# Patient Record
Sex: MALE | Race: WHITE | NOT HISPANIC OR LATINO | Employment: OTHER | URBAN - METROPOLITAN AREA
[De-identification: names, ages, dates, MRNs, and addresses within clinical notes are randomized per-mention and may not be internally consistent; named-entity substitution may affect disease eponyms.]

---

## 2017-01-20 ENCOUNTER — OFFICE VISIT - HEALTHEAST (OUTPATIENT)
Dept: CARDIOLOGY | Facility: CLINIC | Age: 67
End: 2017-01-20

## 2017-01-20 DIAGNOSIS — E78.00 HYPERCHOLESTEREMIA: ICD-10-CM

## 2017-01-20 DIAGNOSIS — I25.6 SILENT MYOCARDIAL ISCHEMIA: ICD-10-CM

## 2017-01-20 DIAGNOSIS — E11.9 DIABETES MELLITUS (H): ICD-10-CM

## 2017-01-20 DIAGNOSIS — I25.10 CORONARY ARTERY DISEASE INVOLVING NATIVE CORONARY ARTERY OF NATIVE HEART WITHOUT ANGINA PECTORIS: ICD-10-CM

## 2017-01-20 DIAGNOSIS — Z78.9 STATIN INTOLERANCE: ICD-10-CM

## 2017-01-20 RX ORDER — CLONIDINE HYDROCHLORIDE 0.1 MG/1
1 TABLET ORAL 4 TIMES DAILY
Refills: 1 | Status: SHIPPED | COMMUNITY
Start: 2016-12-26

## 2017-01-20 ASSESSMENT — MIFFLIN-ST. JEOR: SCORE: 1774.12

## 2017-03-17 ENCOUNTER — HOSPITAL ENCOUNTER (OUTPATIENT)
Dept: CARDIOLOGY | Facility: CLINIC | Age: 67
Discharge: HOME | End: 2017-03-17
Attending: INTERNAL MEDICINE

## 2017-03-17 ENCOUNTER — RECORDS - HEALTHEAST (OUTPATIENT)
Dept: ADMINISTRATIVE | Facility: OTHER | Age: 67
End: 2017-03-17

## 2017-03-17 ENCOUNTER — COMMUNICATION - HEALTHEAST (OUTPATIENT)
Dept: CARDIOLOGY | Facility: CLINIC | Age: 67
End: 2017-03-17

## 2017-03-17 DIAGNOSIS — I25.10 CORONARY ARTERY DISEASE INVOLVING NATIVE CORONARY ARTERY OF NATIVE HEART WITHOUT ANGINA PECTORIS: ICD-10-CM

## 2017-03-17 LAB
ECHO EJECTION FRACTION ESTIMATED: 55 %
STRESS ECHO BASELINE BP: NORMAL M/S

## 2017-04-24 ENCOUNTER — COMMUNICATION - HEALTHEAST (OUTPATIENT)
Dept: CARDIOLOGY | Facility: CLINIC | Age: 67
End: 2017-04-24

## 2017-11-03 ENCOUNTER — COMMUNICATION - HEALTHEAST (OUTPATIENT)
Dept: ADMINISTRATIVE | Facility: CLINIC | Age: 67
End: 2017-11-03

## 2018-03-13 ENCOUNTER — COMMUNICATION - HEALTHEAST (OUTPATIENT)
Dept: ADMINISTRATIVE | Facility: CLINIC | Age: 68
End: 2018-03-13

## 2018-03-29 ENCOUNTER — COMMUNICATION - HEALTHEAST (OUTPATIENT)
Dept: CARDIOLOGY | Facility: CLINIC | Age: 68
End: 2018-03-29

## 2018-03-29 DIAGNOSIS — I25.10 CAD (CORONARY ARTERY DISEASE): ICD-10-CM

## 2018-04-06 ENCOUNTER — OFFICE VISIT - HEALTHEAST (OUTPATIENT)
Dept: CARDIOLOGY | Facility: CLINIC | Age: 68
End: 2018-04-06

## 2018-04-06 ENCOUNTER — COMMUNICATION - HEALTHEAST (OUTPATIENT)
Dept: TELEHEALTH | Facility: CLINIC | Age: 68
End: 2018-04-06

## 2018-04-06 DIAGNOSIS — I25.6 SILENT MYOCARDIAL ISCHEMIA: ICD-10-CM

## 2018-04-06 DIAGNOSIS — I10 BENIGN ESSENTIAL HTN: ICD-10-CM

## 2018-04-06 DIAGNOSIS — E11.59 TYPE 2 DIABETES MELLITUS WITH CARDIAC COMPLICATION (H): ICD-10-CM

## 2018-04-06 DIAGNOSIS — Z78.9 STATIN INTOLERANCE: ICD-10-CM

## 2018-04-06 DIAGNOSIS — E78.00 HYPERCHOLESTEREMIA: ICD-10-CM

## 2018-04-06 DIAGNOSIS — I25.10 CORONARY ARTERY DISEASE INVOLVING NATIVE CORONARY ARTERY OF NATIVE HEART WITHOUT ANGINA PECTORIS: ICD-10-CM

## 2018-04-06 LAB
ANION GAP SERPL CALCULATED.3IONS-SCNC: 8 MMOL/L (ref 5–18)
BUN SERPL-MCNC: 12 MG/DL (ref 8–28)
CALCIUM SERPL-MCNC: 9.7 MG/DL (ref 8.5–10.5)
CHLORIDE BLD-SCNC: 103 MMOL/L (ref 98–107)
CHOLEST SERPL-MCNC: 128 MG/DL
CO2 SERPL-SCNC: 29 MMOL/L (ref 22–31)
CREAT SERPL-MCNC: 0.91 MG/DL (ref 0.7–1.3)
FASTING STATUS PATIENT QL REPORTED: NO
GFR SERPL CREATININE-BSD FRML MDRD: >60 ML/MIN/1.73M2
GLUCOSE BLD-MCNC: 181 MG/DL (ref 70–125)
HDLC SERPL-MCNC: 31 MG/DL
LDLC SERPL CALC-MCNC: 79 MG/DL
POTASSIUM BLD-SCNC: 4.3 MMOL/L (ref 3.5–5)
SODIUM SERPL-SCNC: 140 MMOL/L (ref 136–145)
TRIGL SERPL-MCNC: 91 MG/DL

## 2018-04-06 ASSESSMENT — MIFFLIN-ST. JEOR: SCORE: 1769.58

## 2018-04-18 ENCOUNTER — HOSPITAL ENCOUNTER (OUTPATIENT)
Dept: CARDIOLOGY | Facility: CLINIC | Age: 68
Discharge: HOME | End: 2018-04-18
Attending: INTERNAL MEDICINE

## 2018-04-18 ENCOUNTER — HOSPITAL ENCOUNTER (OUTPATIENT)
Dept: NUCLEAR MEDICINE | Facility: CLINIC | Age: 68
Discharge: HOME | End: 2018-04-18
Attending: INTERNAL MEDICINE

## 2018-04-18 DIAGNOSIS — I25.6 SILENT MYOCARDIAL ISCHEMIA: ICD-10-CM

## 2018-04-18 DIAGNOSIS — I25.10 CORONARY ARTERY DISEASE INVOLVING NATIVE CORONARY ARTERY OF NATIVE HEART WITHOUT ANGINA PECTORIS: ICD-10-CM

## 2018-04-18 LAB
CV STRESS MAX HR HE: 78
NUC STRESS EJECTION FRACTION: 53 %
STRESS ECHO BASELINE BP: NORMAL MM OF HG
STRESS ECHO BASELINE HR: 54 BPM
STRESS ECHO CALCULATED PERCENT HR: 52 %
STRESS ECHO LAST STRESS BP: NORMAL MM OF HG

## 2018-04-18 RX ORDER — SILDENAFIL CITRATE 20 MG/1
TABLET ORAL
Refills: 3 | Status: SHIPPED | COMMUNITY
Start: 2018-03-28

## 2018-08-06 ENCOUNTER — COMMUNICATION - HEALTHEAST (OUTPATIENT)
Dept: CARDIOLOGY | Facility: CLINIC | Age: 68
End: 2018-08-06

## 2018-08-06 DIAGNOSIS — I48.91 A-FIB (H): ICD-10-CM

## 2018-08-07 ENCOUNTER — AMBULATORY - HEALTHEAST (OUTPATIENT)
Dept: CARDIOLOGY | Facility: CLINIC | Age: 68
End: 2018-08-07

## 2018-08-07 DIAGNOSIS — I48.91 A-FIB (H): ICD-10-CM

## 2018-08-07 LAB
ATRIAL RATE - MUSE: 71 BPM
DIASTOLIC BLOOD PRESSURE - MUSE: NORMAL MMHG
INTERPRETATION ECG - MUSE: NORMAL
P AXIS - MUSE: 37 DEGREES
PR INTERVAL - MUSE: 170 MS
QRS DURATION - MUSE: 146 MS
QT - MUSE: 420 MS
QTC - MUSE: 456 MS
R AXIS - MUSE: -8 DEGREES
SYSTOLIC BLOOD PRESSURE - MUSE: NORMAL MMHG
T AXIS - MUSE: -52 DEGREES
VENTRICULAR RATE- MUSE: 71 BPM

## 2018-08-15 ENCOUNTER — OFFICE VISIT - HEALTHEAST (OUTPATIENT)
Dept: CARDIOLOGY | Facility: CLINIC | Age: 68
End: 2018-08-15

## 2018-08-15 ENCOUNTER — AMBULATORY - HEALTHEAST (OUTPATIENT)
Dept: CARDIOLOGY | Facility: CLINIC | Age: 68
End: 2018-08-15

## 2018-08-15 DIAGNOSIS — E11.59 TYPE 2 DIABETES MELLITUS WITH CARDIAC COMPLICATION (H): ICD-10-CM

## 2018-08-15 DIAGNOSIS — I25.10 CORONARY ARTERY DISEASE INVOLVING NATIVE CORONARY ARTERY OF NATIVE HEART WITHOUT ANGINA PECTORIS: ICD-10-CM

## 2018-08-15 DIAGNOSIS — I10 BENIGN ESSENTIAL HTN: ICD-10-CM

## 2018-08-15 DIAGNOSIS — E78.00 HYPERCHOLESTEREMIA: ICD-10-CM

## 2018-08-15 DIAGNOSIS — I25.6 SILENT MYOCARDIAL ISCHEMIA: ICD-10-CM

## 2018-08-15 DIAGNOSIS — I49.9 IRREGULAR HEART BEAT: ICD-10-CM

## 2018-08-15 ASSESSMENT — MIFFLIN-ST. JEOR: SCORE: 1754.8

## 2019-04-28 ENCOUNTER — COMMUNICATION - HEALTHEAST (OUTPATIENT)
Dept: CARDIOLOGY | Facility: CLINIC | Age: 69
End: 2019-04-28

## 2019-04-28 DIAGNOSIS — I25.10 CORONARY ARTERY DISEASE INVOLVING NATIVE CORONARY ARTERY OF NATIVE HEART WITHOUT ANGINA PECTORIS: ICD-10-CM

## 2019-04-28 DIAGNOSIS — I25.6 SILENT MYOCARDIAL ISCHEMIA: ICD-10-CM

## 2019-04-29 RX ORDER — CHLORTHALIDONE 25 MG/1
TABLET ORAL
Qty: 30 TABLET | Refills: 5 | Status: SHIPPED | OUTPATIENT
Start: 2019-04-29

## 2021-05-30 VITALS — BODY MASS INDEX: 31.08 KG/M2 | WEIGHT: 222 LBS | HEIGHT: 71 IN

## 2021-06-01 VITALS — BODY MASS INDEX: 30.49 KG/M2 | HEIGHT: 71 IN | WEIGHT: 217.8 LBS

## 2021-06-01 VITALS — BODY MASS INDEX: 30.94 KG/M2 | WEIGHT: 221 LBS | HEIGHT: 71 IN

## 2021-06-08 NOTE — PROGRESS NOTES
[facility]   Outpatient Follow-up evaluation.     Current Outpatient Prescriptions:      acyclovir (ZOVIRAX) 200 MG capsule, Take 200 mg by mouth 2 (two) times a day. , Disp: , Rfl:      aspirin 81 MG EC tablet, Take 81 mg by mouth daily., Disp: , Rfl:      cholecalciferol, vitamin D3, (VITAMIN D3) 2,000 unit cap, Take 5,000 Units by mouth daily. , Disp: , Rfl:      cloNIDine HCl (CATAPRES) 0.1 MG tablet, Take 1 tablet by mouth 3 (three) times a day., Disp: , Rfl: 1     DOCOSAHEXANOIC ACID/EPA (FISH OIL ORAL), Take 1,100 mg by mouth daily., Disp: , Rfl:      lisinopril (PRINIVIL,ZESTRIL) 40 MG tablet, Take 40 mg by mouth daily., Disp: , Rfl:      metFORMIN (GLUCOPHAGE) 500 MG tablet, Take 500 mg by mouth 4 (four) times a day., Disp: , Rfl:      metoprolol (LOPRESSOR) 25 MG tablet, Take 12.5 mg by mouth daily., Disp: , Rfl:      MULTIVITAMIN ORAL, Take by mouth every other day. , Disp: , Rfl:      POTASSIUM/MAGNESIUM (MAGNESIUM-POTASSIUM ORAL), Take by mouth every other day., Disp: , Rfl:      red yeast rice 600 mg Tab, Take 1,200 mg by mouth 2 (two) times a day. , Disp: , Rfl:      terazosin (HYTRIN) 5 MG capsule, Take 5 mg by mouth bedtime. , Disp: , Rfl:      vitamin E 400 UNIT capsule, Take 400 Units by mouth every other day. , Disp: , Rfl:      amLODIPine (NORVASC) 10 MG tablet, Take 10 mg by mouth daily., Disp: , Rfl:      cloNIDine HCl (CATAPRES) 0.2 MG tablet, Take 0.2 mg by mouth 2 (two) times a day., Disp: , Rfl:      magnesium oxide 500 mg Tab, Take by mouth daily. , Disp: , Rfl:      metoprolol succinate (TOPROL-XL) 12.5 MG, Take 25 mg by mouth daily., Disp: , Rfl:      pravastatin (PRAVACHOL) 10 MG tablet, Take 10 mg by mouth bedtime., Disp: , Rfl:      SAW PALMETTO ORAL, Take by mouth., Disp: , Rfl:         Guzman Renee is a 66 y.o. Male    Chief Complaint   Patient presents with     Follow-up       Diagnoses:  Coronary artery disease, silent myocardial ischemia, hyperlipidemia,  statin intolerance,    Recommendations:    I would prefer that he be on a lipid-lowering agent but the present time he only wishes to take niacin he is not after seen taking fenofibrate's and was not interested in additional agents.  He is completely intolerant of all statins.  For this reason I think that he will need serial following of his coronary disease and because he lacks any anginal recognition of ischemia would favor stress echo at this time to reassess the potential underlying risk of ischemia      Subjective:   No chest pain in a patient with silent myocardial ischemia.  Breathing has been stable.  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  All resected well but does feel some fatigue.  He did have much fatigue before his ischemia was identified.  No other new symptoms at this time    No past medical history on file.  No past surgical history on file.  Allergies   Allergen Reactions     Crestor [Rosuvastatin]      myalgia     Penicillins      Sulfa (Sulfonamide Antibiotics)      No family history on file.   Social History     Social History     Marital status:      Spouse name: N/A     Number of children: N/A     Years of education: N/A     Occupational History     Not on file.     Social History Main Topics     Smoking status: Never Smoker     Smokeless tobacco: Not on file     Alcohol use Not on file     Drug use: Not on file     Sexual activity: Not on file     Other Topics Concern     Not on file     Social History Narrative     Family history not pertinent to chief complaint or presenting problem    Current Outpatient Prescriptions:      acyclovir (ZOVIRAX) 200 MG capsule, Take 200 mg by mouth 2 (two) times a day. , Disp: , Rfl:      aspirin 81 MG EC tablet, Take 81 mg by mouth daily., Disp: , Rfl:      cholecalciferol, vitamin D3, (VITAMIN D3) 2,000 unit cap, Take 5,000 Units by mouth daily. , Disp: , Rfl:      cloNIDine HCl (CATAPRES) 0.1 MG tablet, Take 1 tablet by mouth 3 (three) times a  "day., Disp: , Rfl: 1     DOCOSAHEXANOIC ACID/EPA (FISH OIL ORAL), Take 1,100 mg by mouth daily., Disp: , Rfl:      lisinopril (PRINIVIL,ZESTRIL) 40 MG tablet, Take 40 mg by mouth daily., Disp: , Rfl:      metFORMIN (GLUCOPHAGE) 500 MG tablet, Take 500 mg by mouth 4 (four) times a day., Disp: , Rfl:      metoprolol (LOPRESSOR) 25 MG tablet, Take 12.5 mg by mouth daily., Disp: , Rfl:      MULTIVITAMIN ORAL, Take by mouth every other day. , Disp: , Rfl:      POTASSIUM/MAGNESIUM (MAGNESIUM-POTASSIUM ORAL), Take by mouth every other day., Disp: , Rfl:      red yeast rice 600 mg Tab, Take 1,200 mg by mouth 2 (two) times a day. , Disp: , Rfl:      terazosin (HYTRIN) 5 MG capsule, Take 5 mg by mouth bedtime. , Disp: , Rfl:      vitamin E 400 UNIT capsule, Take 400 Units by mouth every other day. , Disp: , Rfl:      amLODIPine (NORVASC) 10 MG tablet, Take 10 mg by mouth daily., Disp: , Rfl:      cloNIDine HCl (CATAPRES) 0.2 MG tablet, Take 0.2 mg by mouth 2 (two) times a day., Disp: , Rfl:      magnesium oxide 500 mg Tab, Take by mouth daily. , Disp: , Rfl:      metoprolol succinate (TOPROL-XL) 12.5 MG, Take 25 mg by mouth daily., Disp: , Rfl:      pravastatin (PRAVACHOL) 10 MG tablet, Take 10 mg by mouth bedtime., Disp: , Rfl:      SAW PALMETTO ORAL, Take by mouth., Disp: , Rfl:       Objective:   Visit Vitals     BP (!) 156/100 (Patient Site: Left Arm, Patient Position: Sitting, Cuff Size: Adult Large)     Pulse 80     Resp 16     Ht 5' 11\" (1.803 m)     Wt 222 lb (100.7 kg)     BMI 30.96 kg/m2     222 lb (100.7 kg)   Wt Readings from Last 3 Encounters:   01/20/17 222 lb (100.7 kg)   12/11/14 219 lb (99.3 kg)     BP Readings from Last 3 Encounters:   01/20/17 (!) 156/100   12/11/14 122/80     Pulse Readings from Last 3 Encounters:   01/20/17 80   12/11/14 68     General appearance: alert, appears stated age and cooperative  Head: Normocephalic, without obvious abnormality, atraumatic  Eyes: Normal external exam without " jaundice.  Ears: Normal external auricular exam.  Nose: Normal external exam.  Lungs: clear to auscultation bilaterally  Chest wall: no tenderness  Heart: regular rate and rhythm, S1, S2 normal, no murmur, click, rub or gallop normal  Lab Results   Component Value Date    CHOL 158 01/06/2012    TRIG 91 01/06/2012    HDL 29 (L) 01/06/2012     Pulses: 2+ and symmetric  Skin: Skin color, texture, turgor normal.   Neurologic: Grossly normal, no focal neurologic findings.    Review of Systems:   General: Weight Gain  Cardiographics: Reviewed in clinic.    Lab Results:  Lab Results: Personally reviewed  Lab Results   Component Value Date    CHOL 158 01/06/2012    TRIG 91 01/06/2012    HDL 29 (L) 01/06/2012       Clinical evaluation time today including exam 30 minutes.  At least 50% of clinic evaluation time involved in assessment and patient counseling.  Part of this chart was created using a dictation software.  Typographic errors, word substitutions, and grammatical errors may unintentionally occur.    Too Aldana M.D.  [facility]

## 2021-06-09 NOTE — PROGRESS NOTES
Pt came into day with elevated B/P 200/110's.  Discussed with Dr Byers and  pt return for Lexiscan.  Instructed pt to f/u with Dr Aldana for B/P control.. Will send message to Dr Aldana and nurse. Pt agreeable to plan  Berna Raza RN

## 2021-06-16 PROBLEM — I49.9 IRREGULAR HEART BEAT: Status: ACTIVE | Noted: 2018-08-15

## 2021-06-16 PROBLEM — E11.59 TYPE 2 DIABETES MELLITUS WITH CARDIAC COMPLICATION (H): Status: ACTIVE | Noted: 2018-04-06

## 2021-06-16 PROBLEM — Z78.9 STATIN INTOLERANCE: Status: ACTIVE | Noted: 2018-04-06

## 2021-06-16 PROBLEM — I25.10 CORONARY ARTERY DISEASE INVOLVING NATIVE CORONARY ARTERY OF NATIVE HEART WITHOUT ANGINA PECTORIS: Status: ACTIVE | Noted: 2018-04-06

## 2021-06-16 PROBLEM — E78.00 HYPERCHOLESTEREMIA: Status: ACTIVE | Noted: 2018-04-06

## 2021-06-16 PROBLEM — I10 BENIGN ESSENTIAL HTN: Status: ACTIVE | Noted: 2018-04-06

## 2021-06-16 PROBLEM — I25.6 SILENT MYOCARDIAL ISCHEMIA: Status: ACTIVE | Noted: 2018-04-06

## 2021-06-19 NOTE — PROGRESS NOTES
[facility]   Outpatient Follow-up evaluation.      Current Outpatient Prescriptions:      acyclovir (ZOVIRAX) 200 MG capsule, Take 200 mg by mouth 2 (two) times a day. , Disp: , Rfl:      aspirin 81 MG EC tablet, Take 81 mg by mouth daily., Disp: , Rfl:      chlorthalidone (HYGROTEN) 25 MG tablet, Take 1 tablet (25 mg total) by mouth daily. (Patient taking differently: Take 25 mg by mouth daily. 1/2 tab daily), Disp: 30 tablet, Rfl: 6     cholecalciferol, vitamin D3, (VITAMIN D3) 2,000 unit cap, Take 1,000 Units by mouth daily. Twice daily, Disp: , Rfl:      cloNIDine HCl (CATAPRES) 0.1 MG tablet, Take 1 tablet by mouth 4 (four) times a day. , Disp: , Rfl: 1     DOCOSAHEXANOIC ACID/EPA (FISH OIL ORAL), Take 1,100 mg by mouth daily., Disp: , Rfl:      lisinopril (PRINIVIL,ZESTRIL) 40 MG tablet, Take 40 mg by mouth daily., Disp: , Rfl:      magnesium oxide 500 mg Tab, Take 100 mg by mouth daily. , Disp: , Rfl:      metFORMIN (GLUCOPHAGE) 500 MG tablet, Take 500 mg by mouth 4 (four) times a day., Disp: , Rfl:      metoprolol (LOPRESSOR) 25 MG tablet, Take 12.5 mg by mouth daily., Disp: , Rfl:      MULTIVITAMIN ORAL, Take by mouth daily. , Disp: , Rfl:      POTASSIUM/MAGNESIUM (MAGNESIUM-POTASSIUM ORAL), Take by mouth daily. , Disp: , Rfl:      red yeast rice 600 mg Tab, Take 1,200 mg by mouth 2 (two) times a day. , Disp: , Rfl:      SAW PALMETTO ORAL, Take by mouth daily. , Disp: , Rfl:      sildenafil, antihypertensive, (REVATIO) 20 mg tablet, , Disp: , Rfl: 3     UNABLE TO FIND, Med Name: relief factor Twice daily  Packet of 2, Disp: , Rfl:      vitamin E 400 UNIT capsule, Take 400 Units by mouth every other day. , Disp: , Rfl:      amLODIPine (NORVASC) 10 MG tablet, Take 10 mg by mouth daily., Disp: , Rfl:      pravastatin (PRAVACHOL) 10 MG tablet, Take 10 mg by mouth bedtime., Disp: , Rfl:      terazosin (HYTRIN) 5 MG capsule, Take 5 mg by mouth daily. , Disp: , Rfl:         Guzman Renee is a  "68 y.o. Male    Chief Complaint   Patient presents with     Follow-up       Diagnoses:(See Problem list)      Irregular HB chronic PVC  Doubt a fib as no evidence.  I suspect they were noticing his ANGELICA but could have obtained ECG which may not have been available.    Recommendations:    Holter monitor      Subjective:   Recent colonoscopy was told should see us for a fib.  No info provided and no recording or ecg.  Known chronic ANGELICA.  Unaware of palpitation but knows his IHB.  No syncope or dizziness, no new sx otherwise                    Past Medical History:   Diagnosis Date     Diabetes mellitus, type II (H)      Hypertension      No past surgical history on file.  Allergies   Allergen Reactions     Metrizamide Other (See Comments)     Per pt report     Crestor [Rosuvastatin]      myalgia     Hydralazine Other (See Comments)     Insomnia after IV hydralazine given     Penicillins      Sulfa (Sulfonamide Antibiotics)      No family history on file.   Social History     Social History     Marital status:      Spouse name: N/A     Number of children: N/A     Years of education: N/A     Occupational History     Not on file.     Social History Main Topics     Smoking status: Never Smoker     Smokeless tobacco: Never Used     Alcohol use Not on file     Drug use: No     Sexual activity: Not on file     Other Topics Concern     Not on file     Social History Narrative         Objective:   /82 (Patient Site: Left Arm, Patient Position: Sitting, Cuff Size: Adult Regular)  Pulse 62  Resp 12  Ht 5' 10.98\" (1.803 m)  Wt 217 lb 12.8 oz (98.8 kg)  BMI 30.39 kg/m2  217 lb 12.8 oz (98.8 kg)   Wt Readings from Last 3 Encounters:   08/15/18 217 lb 12.8 oz (98.8 kg)   04/06/18 221 lb (100.2 kg)   01/20/17 222 lb (100.7 kg)     BP Readings from Last 3 Encounters:   08/15/18 138/82   04/06/18 (!) 160/94   01/20/17 (!) 156/100     Pulse Readings from Last 3 Encounters:   08/15/18 62   04/06/18 80   01/20/17 80 "     General appearance: alert, appears stated age and cooperative  Head: Normocephalic, without obvious abnormality, atraumatic  Eyes: Normal external exam without jaundice.  Ears: Normal external auricular exam.  Nose: Normal external exam.  Lungs: clear to auscultation bilaterally  Chest wall: no tenderness  Heart: regular rate and rhythm, S1, S2 normal, no murmur, click, rub or gallop Occasion SHB  Pulses: 2+ and symmetric  Skin: Skin color, texture, turgor normal.   Neurologic: Grossly normal, no focal neurologic findings.    Review of Systems:   General: WNL  Cardiographics: Reviewed in clinic.    Lab Results:  Lab Results: Personally reviewed  Lab Results   Component Value Date     04/06/2018    K 4.3 04/06/2018     04/06/2018    CO2 29 04/06/2018    BUN 12 04/06/2018    CREATININE 0.91 04/06/2018    CALCIUM 9.7 04/06/2018       Clinical evaluation time today including exam 30 minutes.  At least 50% of clinic evaluation time involved in assessment and patient counseling.  Part of this chart was created using a dictation software.  Typographic errors, word substitutions, and grammatical errors may unintentionally occur.    Too Aldana M.D.  [facility]

## 2021-11-16 ENCOUNTER — TRANSCRIBE ORDERS (OUTPATIENT)
Dept: OTHER | Age: 71
End: 2021-11-16
Payer: MEDICARE

## 2021-11-16 DIAGNOSIS — I25.10 CORONARY ARTERY DISEASE INVOLVING NATIVE CORONARY ARTERY OF NATIVE HEART, UNSPECIFIED WHETHER ANGINA PRESENT: Primary | ICD-10-CM

## 2021-12-08 ENCOUNTER — TRANSCRIBE ORDERS (OUTPATIENT)
Dept: CARDIAC REHAB | Facility: CLINIC | Age: 71
End: 2021-12-08
Payer: MEDICARE

## 2021-12-08 ENCOUNTER — HOSPITAL ENCOUNTER (OUTPATIENT)
Dept: CARDIAC REHAB | Facility: CLINIC | Age: 71
End: 2021-12-08
Attending: INTERNAL MEDICINE
Payer: MEDICARE

## 2021-12-08 DIAGNOSIS — Z95.1 S/P CABG (CORONARY ARTERY BYPASS GRAFT): ICD-10-CM

## 2021-12-08 DIAGNOSIS — Z95.1 S/P CABG (CORONARY ARTERY BYPASS GRAFT): Primary | ICD-10-CM

## 2021-12-08 DIAGNOSIS — I25.10 CORONARY ARTERY DISEASE INVOLVING NATIVE CORONARY ARTERY OF NATIVE HEART, UNSPECIFIED WHETHER ANGINA PRESENT: ICD-10-CM

## 2021-12-08 PROCEDURE — 93797 PHYS/QHP OP CAR RHAB WO ECG: CPT

## 2021-12-08 PROCEDURE — 93798 PHYS/QHP OP CAR RHAB W/ECG: CPT

## 2021-12-09 ENCOUNTER — HOSPITAL ENCOUNTER (OUTPATIENT)
Dept: CARDIAC REHAB | Facility: CLINIC | Age: 71
End: 2021-12-09
Attending: INTERNAL MEDICINE
Payer: MEDICARE

## 2021-12-09 PROCEDURE — 93798 PHYS/QHP OP CAR RHAB W/ECG: CPT

## 2021-12-13 ENCOUNTER — HOSPITAL ENCOUNTER (OUTPATIENT)
Dept: CARDIAC REHAB | Facility: CLINIC | Age: 71
End: 2021-12-13
Attending: INTERNAL MEDICINE
Payer: MEDICARE

## 2021-12-13 PROCEDURE — 93798 PHYS/QHP OP CAR RHAB W/ECG: CPT

## 2021-12-15 ENCOUNTER — HOSPITAL ENCOUNTER (OUTPATIENT)
Dept: CARDIAC REHAB | Facility: CLINIC | Age: 71
End: 2021-12-15
Attending: INTERNAL MEDICINE
Payer: MEDICARE

## 2021-12-15 PROCEDURE — 93798 PHYS/QHP OP CAR RHAB W/ECG: CPT

## 2021-12-17 ENCOUNTER — HOSPITAL ENCOUNTER (OUTPATIENT)
Dept: CARDIAC REHAB | Facility: CLINIC | Age: 71
End: 2021-12-17
Attending: INTERNAL MEDICINE
Payer: MEDICARE

## 2021-12-17 PROCEDURE — 93798 PHYS/QHP OP CAR RHAB W/ECG: CPT

## 2021-12-20 ENCOUNTER — HOSPITAL ENCOUNTER (OUTPATIENT)
Dept: CARDIAC REHAB | Facility: CLINIC | Age: 71
End: 2021-12-20
Attending: INTERNAL MEDICINE
Payer: MEDICARE

## 2021-12-20 PROCEDURE — 93798 PHYS/QHP OP CAR RHAB W/ECG: CPT

## 2021-12-22 ENCOUNTER — HOSPITAL ENCOUNTER (OUTPATIENT)
Dept: CARDIAC REHAB | Facility: CLINIC | Age: 71
End: 2021-12-22
Attending: INTERNAL MEDICINE
Payer: MEDICARE

## 2021-12-22 PROCEDURE — 93798 PHYS/QHP OP CAR RHAB W/ECG: CPT

## 2021-12-27 ENCOUNTER — HOSPITAL ENCOUNTER (OUTPATIENT)
Dept: CARDIAC REHAB | Facility: CLINIC | Age: 71
End: 2021-12-27
Attending: INTERNAL MEDICINE
Payer: MEDICARE

## 2021-12-27 PROCEDURE — 93798 PHYS/QHP OP CAR RHAB W/ECG: CPT

## 2021-12-29 ENCOUNTER — HOSPITAL ENCOUNTER (OUTPATIENT)
Dept: CARDIAC REHAB | Facility: CLINIC | Age: 71
End: 2021-12-29
Attending: INTERNAL MEDICINE
Payer: MEDICARE

## 2021-12-29 PROCEDURE — 93798 PHYS/QHP OP CAR RHAB W/ECG: CPT

## 2021-12-31 ENCOUNTER — HOSPITAL ENCOUNTER (OUTPATIENT)
Dept: CARDIAC REHAB | Facility: CLINIC | Age: 71
End: 2021-12-31
Attending: INTERNAL MEDICINE
Payer: MEDICARE

## 2021-12-31 PROCEDURE — 93798 PHYS/QHP OP CAR RHAB W/ECG: CPT

## 2022-01-14 ENCOUNTER — HOSPITAL ENCOUNTER (OUTPATIENT)
Dept: CARDIAC REHAB | Facility: CLINIC | Age: 72
End: 2022-01-14
Attending: INTERNAL MEDICINE
Payer: MEDICARE

## 2022-01-14 PROCEDURE — 93798 PHYS/QHP OP CAR RHAB W/ECG: CPT

## 2022-01-17 ENCOUNTER — HOSPITAL ENCOUNTER (OUTPATIENT)
Dept: CARDIAC REHAB | Facility: CLINIC | Age: 72
End: 2022-01-17
Attending: INTERNAL MEDICINE
Payer: MEDICARE

## 2022-01-17 PROCEDURE — 93798 PHYS/QHP OP CAR RHAB W/ECG: CPT

## 2022-01-21 ENCOUNTER — HOSPITAL ENCOUNTER (OUTPATIENT)
Dept: CARDIAC REHAB | Facility: CLINIC | Age: 72
End: 2022-01-21
Attending: INTERNAL MEDICINE
Payer: MEDICARE

## 2022-01-21 PROCEDURE — 93798 PHYS/QHP OP CAR RHAB W/ECG: CPT

## 2022-01-24 ENCOUNTER — HOSPITAL ENCOUNTER (OUTPATIENT)
Dept: CARDIAC REHAB | Facility: CLINIC | Age: 72
End: 2022-01-24
Attending: INTERNAL MEDICINE
Payer: MEDICARE

## 2022-01-24 PROCEDURE — 93798 PHYS/QHP OP CAR RHAB W/ECG: CPT

## 2022-01-31 ENCOUNTER — HOSPITAL ENCOUNTER (OUTPATIENT)
Dept: CARDIAC REHAB | Facility: CLINIC | Age: 72
End: 2022-01-31
Attending: INTERNAL MEDICINE
Payer: MEDICARE

## 2022-01-31 PROCEDURE — 93798 PHYS/QHP OP CAR RHAB W/ECG: CPT

## 2022-02-04 ENCOUNTER — TRANSCRIBE ORDERS (OUTPATIENT)
Dept: OTHER | Age: 72
End: 2022-02-04
Payer: MEDICARE

## 2022-02-04 ENCOUNTER — MEDICAL CORRESPONDENCE (OUTPATIENT)
Dept: HEALTH INFORMATION MANAGEMENT | Facility: CLINIC | Age: 72
End: 2022-02-04

## 2022-02-04 ENCOUNTER — HOSPITAL ENCOUNTER (OUTPATIENT)
Dept: CARDIAC REHAB | Facility: CLINIC | Age: 72
End: 2022-02-04
Attending: INTERNAL MEDICINE
Payer: MEDICARE

## 2022-02-04 DIAGNOSIS — N52.9 ED (ERECTILE DYSFUNCTION) OF ORGANIC ORIGIN: Primary | ICD-10-CM

## 2022-02-04 DIAGNOSIS — R97.20 ELEVATED PROSTATE SPECIFIC ANTIGEN (PSA): Primary | ICD-10-CM

## 2022-02-04 PROCEDURE — 93798 PHYS/QHP OP CAR RHAB W/ECG: CPT

## 2022-02-11 ENCOUNTER — HOSPITAL ENCOUNTER (OUTPATIENT)
Dept: CARDIAC REHAB | Facility: CLINIC | Age: 72
End: 2022-02-11
Attending: INTERNAL MEDICINE
Payer: MEDICARE

## 2022-02-11 PROCEDURE — 93798 PHYS/QHP OP CAR RHAB W/ECG: CPT

## 2022-02-18 ENCOUNTER — HOSPITAL ENCOUNTER (OUTPATIENT)
Dept: CARDIAC REHAB | Facility: CLINIC | Age: 72
End: 2022-02-18
Attending: INTERNAL MEDICINE
Payer: MEDICARE

## 2022-02-18 PROCEDURE — 93798 PHYS/QHP OP CAR RHAB W/ECG: CPT

## 2022-02-25 ENCOUNTER — HOSPITAL ENCOUNTER (OUTPATIENT)
Dept: CARDIAC REHAB | Facility: CLINIC | Age: 72
End: 2022-02-25
Attending: INTERNAL MEDICINE
Payer: MEDICARE

## 2022-02-25 PROCEDURE — 93798 PHYS/QHP OP CAR RHAB W/ECG: CPT

## 2022-03-04 ENCOUNTER — HOSPITAL ENCOUNTER (OUTPATIENT)
Dept: CARDIAC REHAB | Facility: CLINIC | Age: 72
End: 2022-03-04
Attending: INTERNAL MEDICINE
Payer: MEDICARE

## 2022-03-04 PROCEDURE — 93798 PHYS/QHP OP CAR RHAB W/ECG: CPT

## 2022-03-11 ENCOUNTER — HOSPITAL ENCOUNTER (OUTPATIENT)
Dept: CARDIAC REHAB | Facility: CLINIC | Age: 72
Discharge: HOME | End: 2022-03-11
Attending: INTERNAL MEDICINE
Payer: MEDICARE

## 2022-03-11 PROCEDURE — 93798 PHYS/QHP OP CAR RHAB W/ECG: CPT

## 2022-03-14 ENCOUNTER — HOSPITAL ENCOUNTER (OUTPATIENT)
Dept: CARDIAC REHAB | Facility: CLINIC | Age: 72
Discharge: HOME | End: 2022-03-14
Attending: INTERNAL MEDICINE
Payer: MEDICARE

## 2022-03-14 PROCEDURE — 93798 PHYS/QHP OP CAR RHAB W/ECG: CPT

## 2022-03-18 ENCOUNTER — HOSPITAL ENCOUNTER (OUTPATIENT)
Dept: CARDIAC REHAB | Facility: CLINIC | Age: 72
Discharge: HOME | End: 2022-03-18
Attending: INTERNAL MEDICINE
Payer: MEDICARE

## 2022-03-18 PROCEDURE — 93798 PHYS/QHP OP CAR RHAB W/ECG: CPT | Performed by: OCCUPATIONAL THERAPIST

## 2022-03-25 ENCOUNTER — HOSPITAL ENCOUNTER (OUTPATIENT)
Dept: CARDIAC REHAB | Facility: CLINIC | Age: 72
Discharge: HOME | End: 2022-03-25
Attending: INTERNAL MEDICINE
Payer: MEDICARE

## 2022-03-25 PROCEDURE — 93798 PHYS/QHP OP CAR RHAB W/ECG: CPT

## 2022-03-25 PROCEDURE — 93797 PHYS/QHP OP CAR RHAB WO ECG: CPT

## 2022-06-10 ENCOUNTER — OFFICE VISIT (OUTPATIENT)
Dept: UROLOGY | Facility: CLINIC | Age: 72
End: 2022-06-10
Attending: FAMILY MEDICINE
Payer: MEDICARE

## 2022-06-10 VITALS
BODY MASS INDEX: 29.49 KG/M2 | OXYGEN SATURATION: 94 % | SYSTOLIC BLOOD PRESSURE: 150 MMHG | HEART RATE: 65 BPM | WEIGHT: 206 LBS | DIASTOLIC BLOOD PRESSURE: 102 MMHG | HEIGHT: 70 IN

## 2022-06-10 DIAGNOSIS — N52.9 ED (ERECTILE DYSFUNCTION) OF ORGANIC ORIGIN: Primary | ICD-10-CM

## 2022-06-10 DIAGNOSIS — Z87.448 H/O URETHRAL STRICTURE: ICD-10-CM

## 2022-06-10 DIAGNOSIS — Z87.448 HISTORY OF HEMATURIA: ICD-10-CM

## 2022-06-10 LAB
ALBUMIN UR-MCNC: 100 MG/DL
APPEARANCE UR: CLEAR
BACTERIA #/AREA URNS HPF: ABNORMAL /HPF
BILIRUB UR QL STRIP: NEGATIVE
COLOR UR AUTO: YELLOW
GLUCOSE UR STRIP-MCNC: NEGATIVE MG/DL
HGB UR QL STRIP: ABNORMAL
KETONES UR STRIP-MCNC: NEGATIVE MG/DL
LEUKOCYTE ESTERASE UR QL STRIP: NEGATIVE
NITRATE UR QL: NEGATIVE
PH UR STRIP: 6 [PH] (ref 5–7)
RBC #/AREA URNS AUTO: ABNORMAL /HPF
SP GR UR STRIP: 1.02 (ref 1–1.03)
SQUAMOUS #/AREA URNS AUTO: ABNORMAL /LPF
UROBILINOGEN UR STRIP-ACNC: 0.2 E.U./DL
WBC #/AREA URNS AUTO: ABNORMAL /HPF

## 2022-06-10 PROCEDURE — 99204 OFFICE O/P NEW MOD 45 MIN: CPT | Performed by: UROLOGY

## 2022-06-10 PROCEDURE — 81001 URINALYSIS AUTO W/SCOPE: CPT | Performed by: UROLOGY

## 2022-06-10 RX ORDER — EZETIMIBE 10 MG/1
TABLET ORAL
COMMUNITY
Start: 2022-05-22

## 2022-06-10 RX ORDER — SPIRONOLACTONE 25 MG/1
25 TABLET ORAL
COMMUNITY
Start: 2022-06-03

## 2022-06-10 ASSESSMENT — PAIN SCALES - GENERAL: PAINLEVEL: NO PAIN (1)

## 2022-06-10 NOTE — LETTER
6/10/2022       RE: Guzman Renee  [address] [place] [address]     Dear Colleague,    Thank you for referring your patient, Guzman Renee, to the [facility] at [facility]. Please see a copy of my visit note below.    Urology Consult History and Physical  [place]   Name: Guzman Renee    MRN: [id number]   YOB: 1950       We were asked to see Guzman Renee at the request of Dr. Glass for evaluation and treatment of Erectile dysfunction, history of microscopic hematuria.        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         Past Surgical History:   No past surgical history on file.         Social History:     Social History     Tobacco Use     Smoking status: Never Smoker     Smokeless tobacco: Never Used   Substance Use Topics     Alcohol use: Not on file       History   Smoking Status     Never Smoker   Smokeless Tobacco     Never Used            Family History:   No family history on file.           Allergies:     Allergies   Allergen Reactions     Metrizamide Other (See Comments)     Per pt report     Crestor [Rosuvastatin] Unknown     myalgia     Hydralazine Other (See Comments)     Insomnia after IV hydralazine given      Penicillins Unknown     Sulfa (Sulfonamide Antibiotics) [Sulfa Drugs] Unknown            Medications:     Current Outpatient Medications   Medication Sig     acyclovir (ZOVIRAX) 200 MG capsule [ACYCLOVIR (ZOVIRAX) 200 MG CAPSULE] Take 200 mg by mouth 2 (two) times a day.      amLODIPine (NORVASC) 10 MG tablet [AMLODIPINE (NORVASC) 10 MG TABLET] Take 10 mg by mouth daily.     aspirin 81 MG EC tablet [ASPIRIN 81 MG EC TABLET] Take 81 mg by mouth daily.     chlorthalidone (HYGROTEN) 25 MG tablet [CHLORTHALIDONE (HYGROTEN) 25 MG TABLET] TAKE ONE TABLET BY MOUTH ONCE DAILY     cholecalciferol, vitamin D3, (VITAMIN D3) 2,000 unit cap [CHOLECALCIFEROL, VITAMIN D3, (VITAMIN D3) 2,000 UNIT CAP] Take 1,000 Units by mouth daily. Twice daily     spironolactone (ALDACTONE) 25 MG tablet Take 25 mg by mouth     cloNIDine HCl (CATAPRES) 0.1 MG tablet [CLONIDINE HCL (CATAPRES) 0.1 MG TABLET] Take 1 tablet by mouth 4 (four) times a day.      DOCOSAHEXANOIC ACID/EPA (FISH OIL ORAL) [DOCOSAHEXANOIC ACID/EPA (FISH OIL ORAL)] Take 1,100 mg by mouth daily.     ezetimibe (ZETIA) 10 MG tablet Take 1 Tablet (10 mg) by mouth once daily.     lisinopril (PRINIVIL,ZESTRIL) 40 MG tablet [LISINOPRIL (PRINIVIL,ZESTRIL) 40 MG TABLET] Take 40 mg by mouth daily.     magnesium oxide 500 mg Tab [MAGNESIUM OXIDE 500 MG TAB] Take 100 mg by mouth daily.      metFORMIN (GLUCOPHAGE) 500 MG tablet [METFORMIN (GLUCOPHAGE) 500 MG TABLET] Take 500 mg by mouth 4 (four) times a day.     metoprolol (LOPRESSOR) 25 MG tablet [METOPROLOL (LOPRESSOR) 25 MG TABLET] Take 12.5 mg by mouth daily.     MULTIVITAMIN ORAL [MULTIVITAMIN ORAL] Take by mouth daily.      POTASSIUM/MAGNESIUM (MAGNESIUM-POTASSIUM ORAL) [POTASSIUM/MAGNESIUM (MAGNESIUM-POTASSIUM ORAL)] Take by mouth daily.      pravastatin (PRAVACHOL) 10 MG tablet [PRAVASTATIN (PRAVACHOL) 10 MG TABLET] Take 10 mg by mouth bedtime.     red yeast rice 600 mg Tab [RED YEAST RICE 600 MG TAB] Take 1,200 mg by mouth 2  (two) times a day.      SAW PALMETTO ORAL [SAW PALMETTO ORAL] Take by mouth daily.      sildenafil, antihypertensive, (REVATIO) 20 mg tablet [SILDENAFIL, ANTIHYPERTENSIVE, (REVATIO) 20 MG TABLET]      terazosin (HYTRIN) 5 MG capsule [TERAZOSIN (HYTRIN) 5 MG CAPSULE] Take 5 mg by mouth daily.      UNABLE TO FIND [UNABLE TO FIND] Med Name: relief factor  Twice daily   Packet of 2     vitamin E 400 UNIT capsule [VITAMIN E 400 UNIT CAPSULE] Take 400 Units by mouth every other day.      No current facility-administered medications for this visit.             Review of Systems:     Skin: negative  Eyes: negative  Ears/Nose/Throat: negative  Respiratory: No shortness of breath, dyspnea on exertion, cough, or hemoptysis  Cardiovascular: negative  Gastrointestinal: negative  Genitourinary: as above  Musculoskeletal: negative  Neurologic: negative  Psychiatric: negative  Hematologic/Lymphatic/Immunologic: negative  Endocrine: negative          Physical Exam:   No data found.  There is no height or weight on file to calculate BMI.     General: age-appropriate appearing male in NAD  HEENT: Head AT/NC, EOMI, CN Grossly intact  Lungs: no respiratory distress, or pursed lip breathing  Heart: No obvious jugular venous distension present  Back: no bony midline tenderness, no CVAT bilaterally.  Abdomen: soft, obesely-distended, non-tender. No organomegaly  Lymph: no palpable inguinal lymphadenopathy.  LE: no edema.   Musculoskeltal: extremities normal, no peripheral edema  Skin: no suspicious lesions or rashes  Neuro: Alert, oriented, speech and mentation normal;  moving all 4 extremities equally.  Psych: affect and mood normal          Data:   All laboratory data reviewed:    UA RESULTS:  Recent Labs   Lab Test 06/10/22  1150   COLOR Yellow   APPEARANCE Clear   URINEGLC Negative   URINEBILI Negative   URINEKETONE Negative   SG 1.020   UBLD Small*   URINEPH 6.0   PROTEIN 100 *   UROBILINOGEN 0.2   NITRITE Negative   LEUKEST Negative    Outside urinalysis via care everywhere  11/9/2021 = 0 RBCs  8/23/2021 = 0 RBCs  4/2/2021 = 6-10 RBCs  9/4/2020 = 3-5 RBCs     Lab Results   Component Value Date    CR 0.91 04/06/2018   Outside serum creatinine via care everywhere  4/29/2022 = 0.93         Impression and Plan:   Impression:   72-year-old man with history of erectile dysfunction and prior history of microscopic hematuria with remote history of urethral stricture      Plan:   Erectile dysfunction  - He has been using sildenafil 80 mg as needed with fairly good results though at times he wishes this could be better  - He does get significant headaches with 100 mg  - Prior trial of Cialis did not yield significant results  - We discussed other options for PDE 5 inhibitors, however given his fairly good results with sildenafil I would recommend continued use of this medication    Prior history of microscopic hematuria  - Urinalysis today positive on the dip test for small amount of blood and we will send this for microscopic analysis  - I reviewed his prior urinalyses through care everywhere and noted that his last 2 urinalyses were negative for microscopic hematuria  - If his urinalysis comes back today with microscopic hematuria we will then plan to repeat the hematuria work-up  - I reviewed his most recent renal function which was normal and nonconcerning    History of urethral stricture  - He is currently voiding well with no obstructive symptoms     Thank you for the kind consultation.    Time spent: 20 minutes spent on the date of the encounter doing chart review, history and exam, documentation and further activities as noted above.    Darnell Monterroso MD   Urology  [facility]  [facility] Phone: [phone]  [facility] Phone: [phone]

## 2022-06-10 NOTE — PROGRESS NOTES
Urology Consult [facility] and Physical  [facility]   Name: Guzman Renee    MRN: [id number]   YOB: 1950       We were asked to see Guzman Renee at the request of Dr. Glass for evaluation and treatment of Erectile dysfunction, history of microscopic hematuria.        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         Past Surgical History:   No past surgical history on file.         Social History:     Social History     Tobacco Use     Smoking status: Never Smoker     Smokeless tobacco: Never Used   Substance Use Topics     Alcohol use: Not on file       History   Smoking Status     Never Smoker   Smokeless Tobacco     Never Used            Family History:   No family history on file.           Allergies:     Allergies   Allergen Reactions     Metrizamide Other (See Comments)     Per pt report     Crestor [Rosuvastatin] Unknown     myalgia     Hydralazine Other (See Comments)     Insomnia after IV hydralazine given     Penicillins Unknown     Sulfa (Sulfonamide Antibiotics) [Sulfa Drugs] Unknown            Medications:     Current Outpatient Medications   Medication Sig     acyclovir (ZOVIRAX) 200 MG capsule [ACYCLOVIR (ZOVIRAX) 200 MG CAPSULE] Take 200 mg by mouth 2 (two) times a day.      amLODIPine (NORVASC) 10 MG  tablet [AMLODIPINE (NORVASC) 10 MG TABLET] Take 10 mg by mouth daily.     aspirin 81 MG EC tablet [ASPIRIN 81 MG EC TABLET] Take 81 mg by mouth daily.     chlorthalidone (HYGROTEN) 25 MG tablet [CHLORTHALIDONE (HYGROTEN) 25 MG TABLET] TAKE ONE TABLET BY MOUTH ONCE DAILY     cholecalciferol, vitamin D3, (VITAMIN D3) 2,000 unit cap [CHOLECALCIFEROL, VITAMIN D3, (VITAMIN D3) 2,000 UNIT CAP] Take 1,000 Units by mouth daily. Twice daily     spironolactone (ALDACTONE) 25 MG tablet Take 25 mg by mouth     cloNIDine HCl (CATAPRES) 0.1 MG tablet [CLONIDINE HCL (CATAPRES) 0.1 MG TABLET] Take 1 tablet by mouth 4 (four) times a day.      DOCOSAHEXANOIC ACID/EPA (FISH OIL ORAL) [DOCOSAHEXANOIC ACID/EPA (FISH OIL ORAL)] Take 1,100 mg by mouth daily.     ezetimibe (ZETIA) 10 MG tablet Take 1 Tablet (10 mg) by mouth once daily.     lisinopril (PRINIVIL,ZESTRIL) 40 MG tablet [LISINOPRIL (PRINIVIL,ZESTRIL) 40 MG TABLET] Take 40 mg by mouth daily.     magnesium oxide 500 mg Tab [MAGNESIUM OXIDE 500 MG TAB] Take 100 mg by mouth daily.      metFORMIN (GLUCOPHAGE) 500 MG tablet [METFORMIN (GLUCOPHAGE) 500 MG TABLET] Take 500 mg by mouth 4 (four) times a day.     metoprolol (LOPRESSOR) 25 MG tablet [METOPROLOL (LOPRESSOR) 25 MG TABLET] Take 12.5 mg by mouth daily.     MULTIVITAMIN ORAL [MULTIVITAMIN ORAL] Take by mouth daily.      POTASSIUM/MAGNESIUM (MAGNESIUM-POTASSIUM ORAL) [POTASSIUM/MAGNESIUM (MAGNESIUM-POTASSIUM ORAL)] Take by mouth daily.      pravastatin (PRAVACHOL) 10 MG tablet [PRAVASTATIN (PRAVACHOL) 10 MG TABLET] Take 10 mg by mouth bedtime.     red yeast rice 600 mg Tab [RED YEAST RICE 600 MG TAB] Take 1,200 mg by mouth 2 (two) times a day.      SAW PALMETTO ORAL [SAW PALMETTO ORAL] Take by mouth daily.      sildenafil, antihypertensive, (REVATIO) 20 mg tablet [SILDENAFIL, ANTIHYPERTENSIVE, (REVATIO) 20 MG TABLET]      terazosin (HYTRIN) 5 MG capsule [TERAZOSIN (HYTRIN) 5 MG CAPSULE] Take 5 mg by mouth daily.      UNABLE TO  FIND [UNABLE TO FIND] Med Name: relief factor  Twice daily   Packet of 2     vitamin E 400 UNIT capsule [VITAMIN E 400 UNIT CAPSULE] Take 400 Units by mouth every other day.      No current facility-administered medications for this visit.             Review of Systems:     Skin: negative  Eyes: negative  Ears/Nose/Throat: negative  Respiratory: No shortness of breath, dyspnea on exertion, cough, or hemoptysis  Cardiovascular: negative  Gastrointestinal: negative  Genitourinary: as above  Musculoskeletal: negative  Neurologic: negative  Psychiatric: negative  Hematologic/Lymphatic/Immunologic: negative  Endocrine: negative          Physical Exam:   No data found.  There is no height or weight on file to calculate BMI.     General: age-appropriate appearing male in NAD  HEENT: Head AT/NC, EOMI, CN Grossly intact  Lungs: no respiratory distress, or pursed lip breathing  Heart: No obvious jugular venous distension present  Back: no bony midline tenderness, no CVAT bilaterally.  Abdomen: soft, obesely-distended, non-tender. No organomegaly  Lymph: no palpable inguinal lymphadenopathy.  LE: no edema.   Musculoskeltal: extremities normal, no peripheral edema  Skin: no suspicious lesions or rashes  Neuro: Alert, oriented, speech and mentation normal;  moving all 4 extremities equally.  Psych: affect and mood normal          Data:   All laboratory data reviewed:    UA RESULTS:  Recent Labs   Lab Test 06/10/22  1150   COLOR Yellow   APPEARANCE Clear   URINEGLC Negative   URINEBILI Negative   URINEKETONE Negative   SG 1.020   UBLD Small*   URINEPH 6.0   PROTEIN 100 *   UROBILINOGEN 0.2   NITRITE Negative   LEUKEST Negative   Outside urinalysis via care everywhere  11/9/2021 = 0 RBCs  8/23/2021 = 0 RBCs  4/2/2021 = 6-10 RBCs  9/4/2020 = 3-5 RBCs     Lab Results   Component Value Date    CR 0.91 04/06/2018   Outside serum creatinine via care everywhere  4/29/2022 = 0.93         Impression and Plan:   Impression:   72-year-old  man with history of erectile dysfunction and prior history of microscopic hematuria with remote history of urethral stricture      Plan:   Erectile dysfunction  - He has been using sildenafil 80 mg as needed with fairly good results though at times he wishes this could be better  - He does get significant headaches with 100 mg  - Prior trial of Cialis did not yield significant results  - We discussed other options for PDE 5 inhibitors, however given his fairly good results with sildenafil I would recommend continued use of this medication    Prior history of microscopic hematuria  - Urinalysis today positive on the dip test for small amount of blood and we will send this for microscopic analysis  - I reviewed his prior urinalyses through care everywhere and noted that his last 2 urinalyses were negative for microscopic hematuria  - If his urinalysis comes back today with microscopic hematuria we will then plan to repeat the hematuria work-up  - I reviewed his most recent renal function which was normal and nonconcerning    History of urethral stricture  - He is currently voiding well with no obstructive symptoms     Thank you for the kind consultation.    Time spent: 20 minutes spent on the date of the encounter doing chart review, history and exam, documentation and further activities as noted above.    Darnell Monterroso MD   Urology  [facility]  [facility] Phone: [phone]  [facility] Phone: [phone]

## 2022-06-14 ENCOUNTER — TELEPHONE (OUTPATIENT)
Dept: UROLOGY | Facility: CLINIC | Age: 72
End: 2022-06-14
Payer: MEDICARE

## 2022-06-14 NOTE — TELEPHONE ENCOUNTER
LM for pt to call back.  TYSON Monterroso MD   6/14/2022  9:09 AM CDT         Please contact the patient to inform them of their normal U/A results.     Plan  - No indication for further work up  - He may follow up on PRN basis     Thanks,  Darnell Monterroso MD      Pt called back and I informed him of the above info.  Pt states he has no questions at this time.  He will call back if needed.  TYSON Peterson CMA

## 2023-10-03 ENCOUNTER — VIRTUAL VISIT (OUTPATIENT)
Dept: UROLOGY | Facility: CLINIC | Age: 73
End: 2023-10-03
Payer: MEDICARE

## 2023-10-03 DIAGNOSIS — Z87.448 H/O URETHRAL STRICTURE: ICD-10-CM

## 2023-10-03 DIAGNOSIS — N52.9 ED (ERECTILE DYSFUNCTION) OF ORGANIC ORIGIN: Primary | ICD-10-CM

## 2023-10-03 DIAGNOSIS — Z87.448 HISTORY OF HEMATURIA: ICD-10-CM

## 2023-10-03 PROCEDURE — 99213 OFFICE O/P EST LOW 20 MIN: CPT | Mod: 95 | Performed by: UROLOGY

## 2023-10-03 RX ORDER — LOSARTAN POTASSIUM 100 MG/1
100 TABLET ORAL DAILY
COMMUNITY

## 2023-10-03 NOTE — PROGRESS NOTES
[facility]  CHIEF COMPLAINT   It was my pleasure to see Guzman Renee who is a 73 year old male for follow-up of Erectile dysfunction, prior urethral stricture.      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  There is no height or weight on file to calculate BMI.  General Appearance Adult:   Alert, no acute distress, oriented  HENT: throat/mouth:normal, good dentition  Lungs: no respiratory distress, or pursed lip breathing  Heart: No obvious jugular venous distension present  Abdomen: non - distended  Musculoskeltal: extremities normal, no peripheral edema  Skin: no suspicious lesions or rashes  Neuro: Alert, oriented, speech and mentation normal  Psych: affect and mood normal      U/A:  8/26/2023 = 3-5 RBCs  7/6/2023 = 3-5 RBCs  UA RESULTS:  Recent Labs   Lab Test 06/10/22  1154 06/10/22  1150   COLOR  --  Yellow   APPEARANCE  --  Clear   URINEGLC  --  Negative   URINEBILI  --  Negative   URINEKETONE  --  Negative   SG  --  1.020   UBLD  --  Small*   URINEPH  --  6.0   PROTEIN  --  100*    UROBILINOGEN  --  0.2   NITRITE  --  Negative   LEUKEST  --  Negative   RBCU 0-2  --    WBCU 0-5  --       Outside urinalysis via care everywhere  11/9/2021 = 0 RBCs  8/23/2021 = 0 RBCs  4/2/2021 = 6-10 RBCs  9/4/2020 = 3-5 RBCs    Trichomonas:  9/2/2023 = negative  7/15/2023 = negative  6/9/2023 = POSITIVE           Lab Results   Component Value Date     CR 0.91 04/06/2018   Outside serum creatinine via care everywhere  4/29/2022 = 0.93    PSA:  10/26/2022 = 1.25      ASSESSMENT and PLAN  73-year-old man with history of erectile dysfunction and prior history of microscopic hematuria with remote history of urethral stricture     Mild LUTS  - We discussed the normal process associated with BPH.  He also has a history of urethral stricture  - Prior treatment with tamsulosin with bothersome side effects  - He is doing fairly well without medication    Erectile dysfunction  -He is doing fairly well with sildenafil and I recommend he continue on this  - follow up in 1 year  - he will message if he desires earlier follow up      Time spent: 15 minutes spent on the date of the encounter doing chart review, history and exam, documentation and further activities as noted above.    Darnell Monterroso MD   Urology  [facility]  [facility] Phone: [phone]  [facility] Phone: [phone]          Virtual Visit Details    Type of service:  Video Visit   Video Start Time:  2:15 PM  Video End Time: 2:30 PM    Originating Location (pt. Location): Home    Distant Location (provider location):  On-site  Platform used for Video Visit: [place]

## 2023-10-03 NOTE — NURSING NOTE
Is the patient currently in the state of [place]? YES    Visit mode:VIDEO    If the visit is dropped, the patient can be reconnected by: TELEPHONE VISIT: Phone number:   Telephone Information:   Mobile [phone]       Will anyone else be joining the visit? NO  (If patient encounters technical issues they should call [phone]    How would you like to obtain your AVS? MyChart    Are changes needed to the allergy or medication list? Pt stated no med changes    Reason for visit: Video Visit (1 year follow-up )    Judith KEYS

## 2023-10-14 ENCOUNTER — HEALTH MAINTENANCE LETTER (OUTPATIENT)
Age: 73
End: 2023-10-14

## 2024-05-11 ENCOUNTER — HEALTH MAINTENANCE LETTER (OUTPATIENT)
Age: 74
End: 2024-05-11

## 2024-07-20 ENCOUNTER — HEALTH MAINTENANCE LETTER (OUTPATIENT)
Age: 74
End: 2024-07-20

## 2024-12-07 ENCOUNTER — HEALTH MAINTENANCE LETTER (OUTPATIENT)
Age: 74
End: 2024-12-07

## 2025-06-28 ENCOUNTER — HEALTH MAINTENANCE LETTER (OUTPATIENT)
Age: 75
End: 2025-06-28

## 2025-08-09 ENCOUNTER — HEALTH MAINTENANCE LETTER (OUTPATIENT)
Age: 75
End: 2025-08-09